# Patient Record
Sex: FEMALE | Race: OTHER | HISPANIC OR LATINO
[De-identification: names, ages, dates, MRNs, and addresses within clinical notes are randomized per-mention and may not be internally consistent; named-entity substitution may affect disease eponyms.]

---

## 2019-11-25 PROBLEM — Z00.00 ENCOUNTER FOR PREVENTIVE HEALTH EXAMINATION: Status: ACTIVE | Noted: 2019-11-25

## 2019-11-26 ENCOUNTER — TRANSCRIPTION ENCOUNTER (OUTPATIENT)
Age: 29
End: 2019-11-26

## 2019-11-26 ENCOUNTER — APPOINTMENT (OUTPATIENT)
Dept: OBGYN | Facility: CLINIC | Age: 29
End: 2019-11-26
Payer: COMMERCIAL

## 2019-11-26 VITALS
HEIGHT: 62 IN | WEIGHT: 183 LBS | BODY MASS INDEX: 33.68 KG/M2 | SYSTOLIC BLOOD PRESSURE: 120 MMHG | DIASTOLIC BLOOD PRESSURE: 70 MMHG

## 2019-11-26 DIAGNOSIS — Z32.00 ENCOUNTER FOR PREGNANCY TEST, RESULT UNKNOWN: ICD-10-CM

## 2019-11-26 DIAGNOSIS — Z80.3 FAMILY HISTORY OF MALIGNANT NEOPLASM OF BREAST: ICD-10-CM

## 2019-11-26 DIAGNOSIS — Z80.41 FAMILY HISTORY OF MALIGNANT NEOPLASM OF OVARY: ICD-10-CM

## 2019-11-26 DIAGNOSIS — Z78.9 OTHER SPECIFIED HEALTH STATUS: ICD-10-CM

## 2019-11-26 PROCEDURE — 99203 OFFICE O/P NEW LOW 30 MIN: CPT

## 2019-11-26 PROCEDURE — 76817 TRANSVAGINAL US OBSTETRIC: CPT

## 2019-11-26 RX ORDER — VITAMIN A, ASCORBIC ACID, CHOLECALCIFEROL, TOCOPHEROL, THIAMINE MONONITRATE, RIBOFLAVIN, PYRIDOXINE, FOLIC ACID, CYANOCOBALAMIN, CALCIUM CARBONATE, FERROUS FUMARATE, ZINC OXIDE, CUPRIC OXIDE, NIACINAMIDE, AND FISH OIL 27-1-250MG
27-1 & 312 KIT ORAL
Refills: 0 | Status: ACTIVE | COMMUNITY

## 2019-11-26 NOTE — PHYSICAL EXAM
[No Bleeding] : there was no active vaginal bleeding [Normal] : cervix [Anteversion] : anteverted [Enlarged ___ wks] : enlarged [unfilled] ~Uweeks [Uterine Adnexae] : were not tender and not enlarged [Ovarian Mass (___ Cm)] : there were no adnexal masses [Adnexa Tenderness] : were not tender

## 2019-11-26 NOTE — PROCEDURE
[Yolk Sac] : yolk sac present [Intrauterine Pregnancy] : intrauterine pregnancy [___ day(s)] : [unfilled] days [Current GA by Sonogram: ___ (wks)] : Current GA by Sonogram: [unfilled]Uwks [Fetal Heart] : fetal heart present [WNL] : Transvaginal OB Sonogram WNL [FreeTextEntry1] : Diamnionic twins - 9w 0d & 8w 5d; nl YS's, cx & ovaries; CL on R

## 2019-11-26 NOTE — HISTORY OF PRESENT ILLNESS
[Definite:  ___ (Date)] : the last menstrual period was [unfilled] [Regular Cycle Intervals] : periods have been regular [Frequency: Q ___ days] : menstrual periods occur approximately every [unfilled] days [Menarche Age: ____] : age at menarche was [unfilled]

## 2019-12-18 ENCOUNTER — NON-APPOINTMENT (OUTPATIENT)
Age: 29
End: 2019-12-18

## 2019-12-18 ENCOUNTER — APPOINTMENT (OUTPATIENT)
Dept: OBGYN | Facility: CLINIC | Age: 29
End: 2019-12-18
Payer: COMMERCIAL

## 2019-12-18 ENCOUNTER — LABORATORY RESULT (OUTPATIENT)
Age: 29
End: 2019-12-18

## 2019-12-18 VITALS
WEIGHT: 187 LBS | DIASTOLIC BLOOD PRESSURE: 68 MMHG | HEIGHT: 62 IN | BODY MASS INDEX: 34.41 KG/M2 | SYSTOLIC BLOOD PRESSURE: 120 MMHG

## 2019-12-18 DIAGNOSIS — Z34.00 ENCOUNTER FOR SUPERVISION OF NORMAL FIRST PREGNANCY, UNSPECIFIED TRIMESTER: ICD-10-CM

## 2019-12-18 DIAGNOSIS — Z13.79 ENCOUNTER FOR OTHER SCREENING FOR GENETIC AND CHROMOSOMAL ANOMALIES: ICD-10-CM

## 2019-12-18 PROCEDURE — 0500F INITIAL PRENATAL CARE VISIT: CPT

## 2019-12-18 PROCEDURE — 36415 COLL VENOUS BLD VENIPUNCTURE: CPT

## 2019-12-24 ENCOUNTER — NON-APPOINTMENT (OUTPATIENT)
Age: 29
End: 2019-12-24

## 2019-12-30 ENCOUNTER — NON-APPOINTMENT (OUTPATIENT)
Age: 29
End: 2019-12-30

## 2020-01-08 ENCOUNTER — APPOINTMENT (OUTPATIENT)
Dept: OBGYN | Facility: CLINIC | Age: 30
End: 2020-01-08
Payer: COMMERCIAL

## 2020-01-08 ENCOUNTER — NON-APPOINTMENT (OUTPATIENT)
Age: 30
End: 2020-01-08

## 2020-01-08 VITALS
BODY MASS INDEX: 35.33 KG/M2 | SYSTOLIC BLOOD PRESSURE: 130 MMHG | DIASTOLIC BLOOD PRESSURE: 80 MMHG | HEIGHT: 62 IN | WEIGHT: 192 LBS

## 2020-01-08 PROCEDURE — 0502F SUBSEQUENT PRENATAL CARE: CPT

## 2020-01-13 ENCOUNTER — NON-APPOINTMENT (OUTPATIENT)
Age: 30
End: 2020-01-13

## 2020-01-14 LAB
ABO + RH PNL BLD: NORMAL
APPEARANCE: CLEAR
AR GENE MUT ANL BLD/T: NEGATIVE
BACTERIA UR CULT: NORMAL
BACTERIA: NEGATIVE
BASOPHILS # BLD AUTO: 0.04 K/UL
BASOPHILS NFR BLD AUTO: 0.4 %
BILIRUB UR QL STRIP: NORMAL
BILIRUBIN URINE: NEGATIVE
BLD GP AB SCN SERPL QL: NORMAL
BLOOD URINE: NEGATIVE
C TRACH RRNA SPEC QL NAA+PROBE: NOT DETECTED
CALCIUM OXALATE CRYSTALS: ABNORMAL
CFTR MUT TESTED BLD/T: NEGATIVE
CLARITY UR: CLEAR
COLLECTION METHOD: NORMAL
COLOR: YELLOW
CYTOLOGY CVX/VAG DOC THIN PREP: NORMAL
EOSINOPHIL # BLD AUTO: 0.06 K/UL
EOSINOPHIL NFR BLD AUTO: 0.6 %
GLUCOSE QUALITATIVE U: ABNORMAL
GLUCOSE UR-MCNC: NORMAL
HBV SURFACE AG SER QL: NONREACTIVE
HCG UR QL: 0.2 EU/DL
HCT VFR BLD CALC: 39 %
HCV AB SER QL: NONREACTIVE
HCV S/CO RATIO: 0.21 S/CO
HGB A MFR BLD: 97.3 %
HGB A2 MFR BLD: 2.7 %
HGB BLD-MCNC: 11.9 G/DL
HGB FRACT BLD-IMP: NORMAL
HGB UR QL STRIP.AUTO: NORMAL
HIV1+2 AB SPEC QL IA.RAPID: NONREACTIVE
HPV HIGH+LOW RISK DNA PNL CVX: DETECTED
HYALINE CASTS: 0 /LPF
IMM GRANULOCYTES NFR BLD AUTO: 0.8 %
KETONES UR-MCNC: NORMAL
KETONES URINE: NORMAL
LEUKOCYTE ESTERASE UR QL STRIP: NORMAL
LEUKOCYTE ESTERASE URINE: NEGATIVE
LYMPHOCYTES # BLD AUTO: 1.68 K/UL
LYMPHOCYTES NFR BLD AUTO: 18.2 %
MAN DIFF?: NORMAL
MCHC RBC-ENTMCNC: 24.9 PG
MCHC RBC-ENTMCNC: 30.5 GM/DL
MCV RBC AUTO: 81.8 FL
MEV IGG FLD QL IA: 46.6 AU/ML
MEV IGG+IGM SER-IMP: POSITIVE
MICROSCOPIC-UA: NORMAL
MONOCYTES # BLD AUTO: 0.73 K/UL
MONOCYTES NFR BLD AUTO: 7.9 %
N GONORRHOEA RRNA SPEC QL NAA+PROBE: NOT DETECTED
NEUTROPHILS # BLD AUTO: 6.67 K/UL
NEUTROPHILS NFR BLD AUTO: 72.1 %
NITRITE UR QL STRIP: NORMAL
NITRITE URINE: NEGATIVE
PH UR STRIP: 8
PH URINE: 6.5
PLATELET # BLD AUTO: 352 K/UL
PROT UR STRIP-MCNC: 30
PROTEIN URINE: ABNORMAL
RBC # BLD: 4.77 M/UL
RBC # FLD: 16.3 %
RED BLOOD CELLS URINE: 4 /HPF
RUBV IGG FLD-ACNC: 1.5 INDEX
RUBV IGG SER-IMP: POSITIVE
SOURCE AMPLIFICATION: NORMAL
SP GR UR STRIP: 1.01
SPECIFIC GRAVITY URINE: 1.03
SQUAMOUS EPITHELIAL CELLS: 6 /HPF
T PALLIDUM AB SER QL IA: NEGATIVE
UROBILINOGEN URINE: NORMAL
WBC # FLD AUTO: 9.25 K/UL
WHITE BLOOD CELLS URINE: 5 /HPF

## 2020-01-22 ENCOUNTER — NON-APPOINTMENT (OUTPATIENT)
Age: 30
End: 2020-01-22

## 2020-01-29 ENCOUNTER — NON-APPOINTMENT (OUTPATIENT)
Age: 30
End: 2020-01-29

## 2020-01-29 ENCOUNTER — APPOINTMENT (OUTPATIENT)
Dept: OBGYN | Facility: CLINIC | Age: 30
End: 2020-01-29
Payer: COMMERCIAL

## 2020-01-29 VITALS
BODY MASS INDEX: 35.88 KG/M2 | SYSTOLIC BLOOD PRESSURE: 122 MMHG | DIASTOLIC BLOOD PRESSURE: 70 MMHG | HEIGHT: 62 IN | WEIGHT: 195 LBS

## 2020-01-29 DIAGNOSIS — Z34.90 ENCOUNTER FOR SUPERVISION OF NORMAL PREGNANCY, UNSPECIFIED, UNSPECIFIED TRIMESTER: ICD-10-CM

## 2020-01-29 PROCEDURE — 0502F SUBSEQUENT PRENATAL CARE: CPT

## 2020-02-05 ENCOUNTER — NON-APPOINTMENT (OUTPATIENT)
Age: 30
End: 2020-02-05

## 2020-02-11 LAB
BILIRUB UR QL STRIP: NORMAL
CLARITY UR: CLEAR
COLLECTION METHOD: NORMAL
ESTIMATED AVERAGE GLUCOSE: 134 MG/DL
GLUCOSE UR-MCNC: 250
HBA1C MFR BLD HPLC: 6.3 %
HCG UR QL: 0.2 EU/DL
HGB UR QL STRIP.AUTO: NORMAL
KETONES UR-MCNC: NORMAL
LEUKOCYTE ESTERASE UR QL STRIP: NORMAL
NITRITE UR QL STRIP: NORMAL
PH UR STRIP: 7
PROT UR STRIP-MCNC: NORMAL
SP GR UR STRIP: 1.02

## 2020-02-14 DIAGNOSIS — R73.09 OTHER ABNORMAL GLUCOSE: ICD-10-CM

## 2020-02-18 ENCOUNTER — APPOINTMENT (OUTPATIENT)
Dept: ENDOCRINOLOGY | Facility: CLINIC | Age: 30
End: 2020-02-18
Payer: COMMERCIAL

## 2020-02-18 VITALS
SYSTOLIC BLOOD PRESSURE: 115 MMHG | OXYGEN SATURATION: 97 % | DIASTOLIC BLOOD PRESSURE: 68 MMHG | HEIGHT: 62 IN | BODY MASS INDEX: 36.8 KG/M2 | WEIGHT: 200 LBS | HEART RATE: 102 BPM

## 2020-02-18 DIAGNOSIS — R73.9 HYPERGLYCEMIA, UNSPECIFIED: ICD-10-CM

## 2020-02-18 DIAGNOSIS — Z34.90 ENCOUNTER FOR SUPERVISION OF NORMAL PREGNANCY, UNSPECIFIED, UNSPECIFIED TRIMESTER: ICD-10-CM

## 2020-02-18 PROCEDURE — 99204 OFFICE O/P NEW MOD 45 MIN: CPT

## 2020-02-18 NOTE — HISTORY OF PRESENT ILLNESS
[FreeTextEntry1] : Feb 18, 2020\par \par PCP:  Dr. Chika Mera in [place]\par          Gyn: Dr. Martell\par \par 30 yo LMP Sept 25, EDC July 3, 2020\par Labs show A1c 6.3  \par \par Impression:  Gestational diabetes.\par \par Plan:  Instructed in ADA/ACOG guidelines.\par Toals will be FBS under 90 and peak after meal sugars under 140 mg/dl.\par She will meet with dietician, Bonita Kat.\par ROV here every 4 weeks and for now, call every evening.\par \par Her insurance company covers the One Touch meter. \par \par She calls back in the evening to report that\par BS AC lunch was 72 and 158 after lunch with lemonade.\par 120 after dinner.\par \par \par \par \par                 \par \par \par \par one touch\par \par

## 2020-02-18 NOTE — ASSESSMENT
[FreeTextEntry1] : Gestational diabetes\par Instructed in self blood glucose monitorng\par ACOG/ADA guidelines\par Dietary principles

## 2020-02-18 NOTE — PHYSICAL EXAM
[Alert] : alert [No Acute Distress] : no acute distress [Well Nourished] : well nourished [Well Developed] : well developed [Healthy Appearance] : healthy appearance [Normal Voice/Communication] : normal voice communication [No Proptosis] : no proptosis [No Lid Lag] : no lid lag [Thyroid Not Enlarged] : the thyroid was not enlarged [No Involuntary Movements] : no involuntary movements were seen [No Tremors] : no tremors [Oriented x3] : oriented to person, place, and time [Normal Insight/Judgement] : insight and judgment were intact [Normal Affect] : the affect was normal [Normal Mood] : the mood was normal

## 2020-02-19 ENCOUNTER — APPOINTMENT (OUTPATIENT)
Dept: OBGYN | Facility: CLINIC | Age: 30
End: 2020-02-19
Payer: COMMERCIAL

## 2020-02-19 VITALS
WEIGHT: 198 LBS | SYSTOLIC BLOOD PRESSURE: 110 MMHG | DIASTOLIC BLOOD PRESSURE: 70 MMHG | HEIGHT: 62 IN | BODY MASS INDEX: 36.44 KG/M2

## 2020-02-19 PROCEDURE — 0502F SUBSEQUENT PRENATAL CARE: CPT

## 2020-02-25 ENCOUNTER — APPOINTMENT (OUTPATIENT)
Dept: ENDOCRINOLOGY | Facility: CLINIC | Age: 30
End: 2020-02-25

## 2020-02-27 ENCOUNTER — APPOINTMENT (OUTPATIENT)
Dept: ENDOCRINOLOGY | Facility: CLINIC | Age: 30
End: 2020-02-27
Payer: COMMERCIAL

## 2020-02-27 VITALS
HEART RATE: 105 BPM | DIASTOLIC BLOOD PRESSURE: 82 MMHG | SYSTOLIC BLOOD PRESSURE: 135 MMHG | WEIGHT: 200 LBS | OXYGEN SATURATION: 96 % | BODY MASS INDEX: 36.8 KG/M2 | HEIGHT: 62 IN

## 2020-02-27 PROCEDURE — 99214 OFFICE O/P EST MOD 30 MIN: CPT

## 2020-02-27 RX ORDER — ELECTROLYTES/DEXTROSE
32G X 4 MM SOLUTION, ORAL ORAL
Qty: 90 | Refills: 1 | Status: ACTIVE | COMMUNITY
Start: 2020-02-27 | End: 1900-01-01

## 2020-02-27 RX ORDER — INSULIN DETEMIR 100 [IU]/ML
100 INJECTION, SOLUTION SUBCUTANEOUS DAILY
Qty: 3 | Refills: 3 | Status: ACTIVE | COMMUNITY
Start: 2020-02-27 | End: 1900-01-01

## 2020-03-05 NOTE — HISTORY OF PRESENT ILLNESS
[FreeTextEntry1] : Feb 27, 2020\par \par PCP:  Dr. Chika Mera in [place]\par          Gyn: Dr. Martell\par \par 30 yo LMP Sept 25, EDC July 3, 2020\par Labs show A1c 6.3  \par \par Since last visit, she has not been calling me because she is at Religion services late at night.\par We had a heart to heart discussion about Worship and the importance of calling me every evening and that\par her unborn child takes preference over Ep[employer] activities.\par \par She was evaluated by high risk ob, Dr. Riccardo Pennington [phone], on February 20 and he asked her to also see Dr. Fred Mcclain on Feb 25 15 [facility] in [place] phone \par \par Using One Touch Ultra 2 meter - covered by her insurance.  \par Today  mg/l  and one hour after breakfast today 133 mg/dl. \par Yesterday morning 101 \par On 2/23/ after mother in laws potato salad up to 163, but otherwise all one hour after meal sugars well under 140 mg/dl \par \par She saw Bonita Kat on Monday for nutritional counselling with benefit.\par \par B/O elevated FBS, will start PM Levemir (or alternative covered insulin) and she will call me daily for adjust.   \par \par \par Feb 18, 2020\par \par PCP:  Dr. Chika Mera in [place]\par          Gyn: Dr. Martell\par \par 30 yo LMP Sept 25, EDC July 3, 2020\par Labs show A1c 6.3  \par \par Impression:  Gestational diabetes.\par \par Plan:  Instructed in ADA/ACOG guidelines.\par Toals will be FBS under 90 and peak after meal sugars under 140 mg/dl.\par She will meet with dietician, Bonita Kat.\par ROV here every 4 weeks and for now, call every evening.\par \par Her insurance company covers the One Touch meter. \par \par She calls back in the evening to report that\par BS AC lunch was 72 and 158 after lunch with lemonade.\par 120 after dinner.\par \par \par \par \par                 \par \par \par \par one touch\par \par

## 2020-03-05 NOTE — ASSESSMENT
[FreeTextEntry1] : Because of elevated FBS, will start on PM Levemir and she will call me every evening for now while\par striving to keep FBS under 90,  one hour after meal BS under 140 mg/dl

## 2020-03-11 ENCOUNTER — NON-APPOINTMENT (OUTPATIENT)
Age: 30
End: 2020-03-11

## 2020-03-11 ENCOUNTER — APPOINTMENT (OUTPATIENT)
Dept: OBGYN | Facility: CLINIC | Age: 30
End: 2020-03-11
Payer: COMMERCIAL

## 2020-03-11 VITALS
SYSTOLIC BLOOD PRESSURE: 122 MMHG | WEIGHT: 193 LBS | HEIGHT: 62 IN | DIASTOLIC BLOOD PRESSURE: 80 MMHG | BODY MASS INDEX: 35.51 KG/M2

## 2020-03-11 PROCEDURE — 0502F SUBSEQUENT PRENATAL CARE: CPT

## 2020-03-11 PROCEDURE — 59425 ANTEPARTUM CARE ONLY: CPT

## 2020-03-12 ENCOUNTER — APPOINTMENT (OUTPATIENT)
Dept: ENDOCRINOLOGY | Facility: CLINIC | Age: 30
End: 2020-03-12
Payer: COMMERCIAL

## 2020-03-12 VITALS
OXYGEN SATURATION: 97 % | HEIGHT: 62 IN | WEIGHT: 193 LBS | BODY MASS INDEX: 35.51 KG/M2 | DIASTOLIC BLOOD PRESSURE: 82 MMHG | HEART RATE: 98 BPM | SYSTOLIC BLOOD PRESSURE: 120 MMHG

## 2020-03-12 DIAGNOSIS — O24.419 GESTATIONAL DIABETES MELLITUS IN PREGNANCY, UNSPECIFIED CONTROL: ICD-10-CM

## 2020-03-12 PROCEDURE — 36415 COLL VENOUS BLD VENIPUNCTURE: CPT

## 2020-03-12 PROCEDURE — 99214 OFFICE O/P EST MOD 30 MIN: CPT | Mod: 25

## 2020-03-12 NOTE — PHYSICAL EXAM
[Alert] : alert [No Acute Distress] : no acute distress [Well Nourished] : well nourished [Well Developed] : well developed [Healthy Appearance] : healthy appearance [Normal Voice/Communication] : normal voice communication [No Involuntary Movements] : no involuntary movements were seen [No Tremors] : no tremors [Oriented x3] : oriented to person, place, and time [Normal Insight/Judgement] : insight and judgment were intact [Normal Affect] : the affect was normal [Normal Mood] : the mood was normal [Recent Memory Normal] : recent memory was not impaired [Remote Memory Normal] : remote memory was not impaired

## 2020-03-12 NOTE — ASSESSMENT
[FreeTextEntry1] : Will meet with [facility] high risk ob group tomorrow.\par She will call me tomorrow evening.

## 2020-03-12 NOTE — HISTORY OF PRESENT ILLNESS
[FreeTextEntry1] : Mar 12, 2020\par \par PCP:  Dr. Chika Mera in [place]\par          Gyn: Dr. Martell\par          High risk:  Dr. Riccardo Pennington\par \par Patient has been in telephone contact with me almost every evening as fingersitck BS, diet and dose of Lantus reviewed.\par This morning FBS 97 after 12 units last night and she will increase to 14 units this evening.\par She brings in the OneTouch Ultra 2 \par .\par Dr. 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  She anticipates they will require surgery after delivery.  \par Met with dietician, Bonita Kat, who gave her good suggestions for meal planning.  \par \par This Friday 3/13/2020 she will have echo at [facility].\par \par Plan:  Increase Lantus to 14 units this PM, call me tomorrow evening.\par Likely add Humalog AC meals.     A1c, BS today.\par She has been advised by her employer to work from home and anticipates returning to her office by April 1.\par Asked her to return here before she needs to return to work.    \par \par \par \par Feb 27, 2020\par \par PCP:  Dr. Chika Mera in [place]\par          Gyn: Dr. Martell\par \par 28 yo LMP Sept 25, EDC July 3, 2020\par Labs show A1c 6.3  \par \par Since last visit, she has not been calling me because she is at [employer] services late at night.\par We had a heart to heart discussion about Christian and the importance of calling me every evening and that\par her unborn child takes preference over Buddhist activities.\par \par She was evaluated by high risk ob, Dr. Riccardo Pennington [phone], on February 20 and he asked her to also see Dr. Fred Mcclain on Feb 25 15 [facility] in [place] phone \par \par Using One Touch Ultra 2 meter - covered by her insurance.  \par Today  mg/l  and one hour after breakfast today 133 mg/dl. \par Yesterday morning 101 \par On 2/23/ after mother in laws potato salad up to 163, but otherwise all one hour after meal sugars well under 140 mg/dl \par \par She saw Bonita Kat on Monday for nutritional counselling with benefit.\par \par B/O elevated FBS, will start PM Levemir (or alternative covered insulin) and she will call me daily for adjust.   \par \par \par Feb 18, 2020\par \par PCP:  Dr. Chika Mera in [place]\par          Gyn: Dr. Martell\par \par 28 yo LMP Sept 25, EDC July 3, 2020\par Labs show A1c 6.3  \par \par Impression:  Gestational diabetes.\par \par Plan:  Instructed in ADA/ACOG guidelines.\par Toals will be FBS under 90 and peak after meal sugars under 140 mg/dl.\par She will meet with dietician, Bonita Kat.\par ROV here every 4 weeks and for now, call every evening.\par \par Her insurance company covers the One Touch meter. \par \par She calls back in the evening to report that\par BS AC lunch was 72 and 158 after lunch with lemonade.\par 120 after dinner.\par \par \par \par \par                 \par \par \par \par one touch\par \par

## 2020-03-15 LAB
ANION GAP SERPL CALC-SCNC: 15 MMOL/L
BUN SERPL-MCNC: 10 MG/DL
CALCIUM SERPL-MCNC: 9.8 MG/DL
CHLORIDE SERPL-SCNC: 103 MMOL/L
CO2 SERPL-SCNC: 19 MMOL/L
CREAT SERPL-MCNC: 0.5 MG/DL
ESTIMATED AVERAGE GLUCOSE: 123 MG/DL
FRUCTOSAMINE SERPL-MCNC: 182 UMOL/L
GLUCOSE SERPL-MCNC: 87 MG/DL
HBA1C MFR BLD HPLC: 5.9 %
POTASSIUM SERPL-SCNC: 4.5 MMOL/L
SODIUM SERPL-SCNC: 137 MMOL/L

## 2020-04-01 ENCOUNTER — APPOINTMENT (OUTPATIENT)
Dept: ENDOCRINOLOGY | Facility: CLINIC | Age: 30
End: 2020-04-01

## 2020-07-24 LAB
BILIRUB UR QL STRIP: NORMAL
CLARITY UR: CLEAR
COLLECTION METHOD: NORMAL
GLUCOSE UR-MCNC: NORMAL
HCG UR QL: 1 EU/DL
HGB UR QL STRIP.AUTO: NORMAL
KETONES UR-MCNC: 15
LEUKOCYTE ESTERASE UR QL STRIP: NORMAL
NITRITE UR QL STRIP: NORMAL
PH UR STRIP: 6.5
PROT UR STRIP-MCNC: NORMAL
SP GR UR STRIP: >=1.03